# Patient Record
Sex: MALE | Race: WHITE
[De-identification: names, ages, dates, MRNs, and addresses within clinical notes are randomized per-mention and may not be internally consistent; named-entity substitution may affect disease eponyms.]

---

## 2018-10-05 ENCOUNTER — HOSPITAL ENCOUNTER (OUTPATIENT)
Dept: HOSPITAL 89 - RESP | Age: 58
End: 2018-10-05
Attending: NURSE PRACTITIONER
Payer: COMMERCIAL

## 2018-10-05 DIAGNOSIS — R00.2: Primary | ICD-10-CM

## 2018-10-05 PROCEDURE — 93005 ELECTROCARDIOGRAM TRACING: CPT

## 2018-10-05 NOTE — EKG
FACILITY: [facility] [place] 

 

PATIENT NAME: HIRAM MUNOZ

: [id number]

MR: [id number]

V: [id number]

EXAM DATE: 

ORDERING PHYSICIAN: ABDIFATAH JORDAN

TECHNOLOGIST: VIV

 

Test Reason : PALPITATIONS

Blood Pressure : ***/*** mmHG

Vent. Rate : 068 BPM     Atrial Rate : 068 BPM

   P-R Int : 116 ms          QRS Dur : 090 ms

    QT Int : 400 ms       P-R-T Axes : 008 [id number] 033 degrees

   QTc Int : 425 ms

 

Normal sinus rhythm

Normal ECG

No previous ECGs available

Confirmed by VINICIO DOHERTY ([id number]) on 10/5/2018 1:37:34 PM

 

Referred By:  JULIAN           Confirmed By:VINICIO DOHERTY